# Patient Record
Sex: FEMALE | Race: WHITE | NOT HISPANIC OR LATINO | URBAN - METROPOLITAN AREA
[De-identification: names, ages, dates, MRNs, and addresses within clinical notes are randomized per-mention and may not be internally consistent; named-entity substitution may affect disease eponyms.]

---

## 2019-12-11 ENCOUNTER — OFFICE VISIT (OUTPATIENT)
Dept: OBSTETRICS AND GYNECOLOGY | Facility: CLINIC | Age: 39
End: 2019-12-11
Payer: COMMERCIAL

## 2019-12-11 VITALS
DIASTOLIC BLOOD PRESSURE: 76 MMHG | SYSTOLIC BLOOD PRESSURE: 128 MMHG | BODY MASS INDEX: 19.39 KG/M2 | WEIGHT: 113.6 LBS | HEIGHT: 64 IN

## 2019-12-11 DIAGNOSIS — Z01.419 WELL WOMAN EXAM WITH ROUTINE GYNECOLOGICAL EXAM: ICD-10-CM

## 2019-12-11 DIAGNOSIS — N76.0 ACUTE VAGINITIS: Primary | ICD-10-CM

## 2019-12-11 PROCEDURE — 99386 PREV VISIT NEW AGE 40-64: CPT | Performed by: MIDWIFE

## 2019-12-11 SDOH — ECONOMIC STABILITY: TRANSPORTATION INSECURITY: IN THE PAST 12 MONTHS, HAS LACK OF TRANSPORTATION KEPT YOU FROM MEDICAL APPOINTMENTS OR FROM GETTING MEDICATIONS?: NO

## 2019-12-11 SDOH — ECONOMIC STABILITY: FOOD INSECURITY: WITHIN THE PAST 12 MONTHS, YOU WORRIED THAT YOUR FOOD WOULD RUN OUT BEFORE YOU GOT THE MONEY TO BUY MORE.: NEVER TRUE

## 2019-12-11 SDOH — ECONOMIC STABILITY: FOOD INSECURITY: WITHIN THE PAST 12 MONTHS, THE FOOD YOU BOUGHT JUST DIDN'T LAST AND YOU DIDN'T HAVE MONEY TO GET MORE.: NEVER TRUE

## 2019-12-11 SDOH — ECONOMIC STABILITY: FOOD INSECURITY: HOW HARD IS IT FOR YOU TO PAY FOR THE VERY BASICS LIKE FOOD, HOUSING, MEDICAL CARE, AND HEATING?: NOT HARD AT ALL

## 2019-12-11 ASSESSMENT — ENCOUNTER SYMPTOMS
BACK PAIN: 0
APPETITE CHANGE: 0
PHOTOPHOBIA: 0
DIZZINESS: 0
ABDOMINAL PAIN: 0
BRUISES/BLEEDS EASILY: 0
TREMORS: 0
SHORTNESS OF BREATH: 0
PALPITATIONS: 0
COLOR CHANGE: 0
TROUBLE SWALLOWING: 0
ADENOPATHY: 0
ABDOMINAL DISTENTION: 0
CONFUSION: 0
SLEEP DISTURBANCE: 0
UNEXPECTED WEIGHT CHANGE: 0
BREAST MASS: 0
FLANK PAIN: 0
AGITATION: 0
NAUSEA: 0
FREQUENCY: 0
COUGH: 0
CONSTIPATION: 0
WEAKNESS: 0
VOMITING: 0
NUMBNESS: 0
ANAL BLEEDING: 0
CHEST TIGHTNESS: 0
SEIZURES: 0
BLOOD IN STOOL: 0
RECTAL PAIN: 0
HEADACHES: 0
DEPRESSION: 0
WOUND: 0
NERVOUS/ANXIOUS: 0
FATIGUE: 0
DYSURIA: 0
DIARRHEA: 0
DOUBLE VISION: 0
BREAST PAIN: 0
HEMATURIA: 0
LIGHT-HEADEDNESS: 0
SORE THROAT: 0
ACTIVITY CHANGE: 0
DIFFICULTY URINATING: 0
WHEEZING: 0

## 2019-12-11 ASSESSMENT — ACTIVITIES OF DAILY LIVING (ADL): LACK_OF_TRANSPORTATION: NO

## 2019-12-11 NOTE — PATIENT INSTRUCTIONS
Patient Education   Levonorgestrel intrauterine device (IUD)  What is this medicine?  LEVONORGESTREL IUD (LIONEL alatorre trel) is a contraceptive (birth control) device. The device is placed inside the uterus by a healthcare professional. It is used to prevent pregnancy. This device can also be used to treat heavy bleeding that occurs during your period.  This medicine may be used for other purposes; ask your health care provider or pharmacist if you have questions.  COMMON BRAND NAME(S): Richard, LILTERRA, Amy, Lupis  What should I tell my health care provider before I take this medicine?  They need to know if you have any of these conditions:  -abnormal Pap smear  -cancer of the breast, uterus, or cervix  -diabetes  -endometritis  -genital or pelvic infection now or in the past  -have more than one sexual partner or your partner has more than one partner  -heart disease  -history of an ectopic or tubal pregnancy  -immune system problems  -IUD in place  -liver disease or tumor  -problems with blood clots or take blood-thinners  -seizures  -use intravenous drugs  -uterus of unusual shape  -vaginal bleeding that has not been explained  -an unusual or allergic reaction to levonorgestrel, other hormones, silicone, or polyethylene, medicines, foods, dyes, or preservatives  -pregnant or trying to get pregnant  -breast-feeding  How should I use this medicine?  This device is placed inside the uterus by a health care professional.  Talk to your pediatrician regarding the use of this medicine in children. Special care may be needed.  Overdosage: If you think you have taken too much of this medicine contact a poison control center or emergency room at once.  NOTE: This medicine is only for you. Do not share this medicine with others.  What if I miss a dose?  This does not apply. Depending on the brand of device you have inserted, the device will need to be replaced every 3 to 5 years if you wish to continue using this  type of birth control.  What may interact with this medicine?  Do not take this medicine with any of the following medications:  -amprenavir  -bosentan  -fosamprenavir  This medicine may also interact with the following medications:  -aprepitant  -armodafinil  -barbiturate medicines for inducing sleep or treating seizures  -bexarotene  -boceprevir  -griseofulvin  -medicines to treat seizures like carbamazepine, ethotoin, felbamate, oxcarbazepine, phenytoin, topiramate  -modafinil  -pioglitazone  -rifabutin  -rifampin  -rifapentine  -some medicines to treat HIV infection like atazanavir, efavirenz, indinavir, lopinavir, nelfinavir, tipranavir, ritonavir  -Hamlet's wort  -warfarin  This list may not describe all possible interactions. Give your health care provider a list of all the medicines, herbs, non-prescription drugs, or dietary supplements you use. Also tell them if you smoke, drink alcohol, or use illegal drugs. Some items may interact with your medicine.  What should I watch for while using this medicine?  Visit your doctor or health care professional for regular check ups. See your doctor if you or your partner has sexual contact with others, becomes HIV positive, or gets a sexual transmitted disease.  This product does not protect you against HIV infection (AIDS) or other sexually transmitted diseases.  You can check the placement of the IUD yourself by reaching up to the top of your vagina with clean fingers to feel the threads. Do not pull on the threads. It is a good habit to check placement after each menstrual period. Call your doctor right away if you feel more of the IUD than just the threads or if you cannot feel the threads at all.  The IUD may come out by itself. You may become pregnant if the device comes out. If you notice that the IUD has come out use a backup birth control method like condoms and call your health care provider.  Using tampons will not change the position of the IUD and are  okay to use during your period.  This IUD can be safely scanned with magnetic resonance imaging (MRI) only under specific conditions. Before you have an MRI, tell your healthcare provider that you have an IUD in place, and which type of IUD you have in place.  What side effects may I notice from receiving this medicine?  Side effects that you should report to your doctor or health care professional as soon as possible:  -allergic reactions like skin rash, itching or hives, swelling of the face, lips, or tongue  -fever, flu-like symptoms  -genital sores  -high blood pressure  -no menstrual period for 6 weeks during use  -pain, swelling, warmth in the leg  -pelvic pain or tenderness  -severe or sudden headache  -signs of pregnancy  -stomach cramping  -sudden shortness of breath  -trouble with balance, talking, or walking  -unusual vaginal bleeding, discharge  -yellowing of the eyes or skin  Side effects that usually do not require medical attention (report to your doctor or health care professional if they continue or are bothersome):  -acne  -breast pain  -change in sex drive or performance  -changes in weight  -cramping, dizziness, or faintness while the device is being inserted  -headache  -irregular menstrual bleeding within first 3 to 6 months of use  -nausea  This list may not describe all possible side effects. Call your doctor for medical advice about side effects. You may report side effects to FDA at 9-169-FDA-8308.  Where should I keep my medicine?  This does not apply.  NOTE: This sheet is a summary. It may not cover all possible information. If you have questions about this medicine, talk to your doctor, pharmacist, or health care provider.  © 2019 [employer] (2017-09-29 14:14:56)

## 2019-12-11 NOTE — PROGRESS NOTES
"2019  aRdha Liao is a 39 y.o. female who presents for annual exam.   Periods are regular every 28-30 days, lasting 4 days. Dysmenorrhea:none. Cyclic symptoms include none. No intermenstrual bleeding or spotting.  Reports an increase in vaginal discharge.  She denies any vaginal irritation or burning.  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  She has q 6 months mammograms and ultrasound. She reports several breast biopsies all have been normal.      The patient is sexually active. GYN screening history: last pap: was normal. Domestic violence: no.     Current contraception: condoms  History of abnormal Pap smear: no  Family history of uterine or ovarian cancer: no  Regular self breast exam: yes  History of abnormal mammogram: yes - dense breast gets follow up ultrasounds  Family history of breast cancer: yes - maternal grandmother    Menstrual History:  OB History        3    Para        Term                AB   1    Living   2       SAB   1    TAB        Ectopic        Multiple        Live Births   2                Patient's last menstrual period was 11/15/2019.  Period Cycle (Days): 28  Period Duration (Days): 4  Period Pattern: Regular  Menstrual Flow: Moderate  Menstrual Control: Tampon  Dysmenorrhea: None      Review of Systems and Physical Exam  The following have been reviewed and updated as appropriate in this visit: Tobacco  Allergies  Meds  Problems  Med Hx  Surg Hx  Fam Hx  Soc Hx        Visit Vitals  /76 (BP Location: Right upper arm, Patient Position: Sitting)   Ht 1.626 m (5' 4\")   Wt 51.5 kg (113 lb 9.6 oz)   LMP 11/15/2019   BMI 19.50 kg/m²     HPI  Review of Systems   Constitutional: Negative for activity change, appetite change, fatigue and unexpected weight change.   HENT: Negative for congestion, mouth sores, nosebleeds, sore throat and trouble swallowing.    Eyes: Negative for double vision, photophobia and visual disturbance. "   Respiratory: Negative for cough, chest tightness, shortness of breath and wheezing.    Cardiovascular: Negative for chest pain, palpitations and leg swelling.   Gastrointestinal: Negative for abdominal distention, abdominal pain, anal bleeding, blood in stool, change in stool, constipation, diarrhea, nausea, rectal pain and vomiting.   Endocrine: Negative for cold intolerance and heat intolerance.   Genitourinary: Negative for decreased urine volume, difficulty urinating, dyspareunia, dysuria, enuresis, flank pain, frequency, genital sores, hematuria, irregular menses, menstrual problem, pain with intercourse, pelvic pain, sexual dysfunction, urgency, vaginal bleeding, vaginal discharge, vaginal itching and vaginal pain.   Breast: Negative for breast discharge, breast mass and breast pain.   Musculoskeletal: Negative for back pain and gait problem.   Skin: Negative for color change, pallor, rash and wound.   Allergic/Immunologic: Negative for environmental allergies, food allergies and immunocompromised state.   Neurological: Negative for dizziness, tremors, seizures, syncope, weakness, light-headedness, numbness and headaches.   Hematological: Negative for adenopathy. Does not bruise/bleed easily.   Psychiatric/Behavioral: Negative for agitation, behavioral problems, confusion, depression, self-injury and sleep disturbance. The patient is not nervous/anxious.      Physical Exam   Constitutional: She is oriented to person, place, and time. She appears well-developed and well-nourished.   Genitourinary: Rectum normal, vagina normal and uterus normal. No labial rash or lesion.   External female genitalia normal. No signs of erythema. There is no lesion or tenderness on the right external genitalia. There is no tenderness on the left external genitalia.   Urethral meatus normal.   Urethra normal.   Normal bladder.   Vagina normal. Vagina exhibits no lesion. No bleeding in the vagina. No vaginal discharge found.    Cervix exam normal.Cervix does not exhibit motion tenderness or discharge.   Uterus is normal. Uterus is not mobile or tender.   Adnexa normal. Right adnexum does not display mass, does not display tenderness and does not display fullness. Left adnexum does not display mass, does not display tenderness and does not display fullness. Rectal exam normal.   Neck: Normal range of motion. No thyromegaly present.   Cardiovascular: Normal rate, regular rhythm and normal heart sounds.   No murmur heard.  Pulmonary/Chest: Effort normal and breath sounds normal. No respiratory distress. She has no wheezes. She has no rales. Right breast exhibits no inverted nipple, no mass, no nipple discharge, no skin change and no tenderness. Left breast exhibits no mass, no nipple discharge, no skin change and no tenderness. Breasts are symmetrical.   Abdominal: Soft. Bowel sounds are normal. She exhibits no distension. There is no tenderness.   Musculoskeletal: Normal range of motion. She exhibits no edema.   Neurological: She is alert and oriented to person, place, and time.   Skin: Skin is warm and dry. Capillary refill takes less than 2 seconds.   Psychiatric: She has a normal mood and affect. Her behavior is normal. Judgment and thought content normal.   Vitals reviewed.      There are no diagnoses linked to this encounter..  All questions answered.  Breast self exam technique reviewed and patient encouraged to perform self-exam monthly.  Thin prep Pap smear.   Pt would like Mirena IUD.   ~Reviewed risks/benefits of IUD.  Will schedule for January  Culture for BV sent   No follow-ups on file.  Aury Alves CNM

## 2019-12-12 LAB
Lab: NORMAL
QUEST (ALWAYS MESSAGE): NORMAL
QUEST RESOLUTION:: NORMAL
QUEST: NORMAL

## 2019-12-16 LAB
BV SCORE VAG QL NAA+PROBE: NORMAL
C GLABRATA DNA VAG QL NAA+PROBE: NOT DETECTED
C PARAP DNA VAG QL NAA+PROBE: NOT DETECTED
C TROPICLS DNA VAG QL NAA+PROBE: NOT DETECTED
CANDIDA DNA VAG QL NAA+PROBE: NOT DETECTED
CLINICAL INFO: NORMAL
CYTO CVX: NORMAL
CYTOLOGIST CVX/VAG CYTO: NORMAL
CYTOLOGY CMNT CVX/VAG CYTO-IMP: NORMAL
DATE OF PREVIOUS PAP: NEGATIVE
DATE PREVIOUS BX: NORMAL
G VAGINALIS DNA VAG NAA+PROBE-LOG#: NOT DETECTED
HPV E6+E7 MRNA CVX QL NAA+PROBE: NOT DETECTED
LMP START DATE: NORMAL
MEGASPHAERA SP DNA VAG NAA+PROBE-LOG#: NOT DETECTED
QUEST ATOPOBIUM VAGINAE: NOT DETECTED
QUEST COMMENT (PAP): NORMAL
QUEST COMMENT: NORMAL
QUEST CONTACT:: NORMAL
QUEST LACTOBACILLUS SPECIES: 7.2 LOG (CELLS/ML)
QUEST REPORT ALWAYS MESSAGE DEMOGRAPHICS IN QUESTION: NORMAL
REF LAB TEST NAME: NORMAL
REF LAB TEST: NORMAL
SPECIMEN SOURCE CVX/VAG CYTO: NORMAL
STAT OF ADQ CVX/VAG CYTO-IMP: NORMAL
T VAGINALIS RRNA GENITAL QL PROBE: NORMAL
T VAGINALIS RRNA SPEC QL NAA+PROBE: NOT DETECTED

## 2020-01-13 ENCOUNTER — TELEPHONE (OUTPATIENT)
Dept: OBSTETRICS AND GYNECOLOGY | Facility: CLINIC | Age: 40
End: 2020-01-13

## 2020-01-13 NOTE — TELEPHONE ENCOUNTER
ALIS.     [facility] active & verified Eff: 1/1/15 via [employer]. No C/P, Coins 100%, No Ded, No pre-cert or referral req. 1/13. HARLEY

## 2021-01-28 DIAGNOSIS — Z23 ENCOUNTER FOR IMMUNIZATION: ICD-10-CM

## 2022-06-16 ENCOUNTER — TRANSCRIBE ORDERS (OUTPATIENT)
Dept: SCHEDULING | Age: 42
End: 2022-06-16

## 2022-06-16 DIAGNOSIS — N92.0 EXCESSIVE AND FREQUENT MENSTRUATION WITH REGULAR CYCLE: Primary | ICD-10-CM

## 2022-06-20 ENCOUNTER — HOSPITAL ENCOUNTER (OUTPATIENT)
Dept: RADIOLOGY | Facility: HOSPITAL | Age: 42
Discharge: HOME | End: 2022-06-20
Attending: OBSTETRICS & GYNECOLOGY
Payer: COMMERCIAL

## 2022-06-20 DIAGNOSIS — N92.0 EXCESSIVE AND FREQUENT MENSTRUATION WITH REGULAR CYCLE: ICD-10-CM

## 2022-06-20 PROCEDURE — 76830 TRANSVAGINAL US NON-OB: CPT

## 2024-07-12 ENCOUNTER — OFFICE VISIT (OUTPATIENT)
Dept: OBSTETRICS AND GYNECOLOGY | Facility: CLINIC | Age: 44
End: 2024-07-12
Payer: COMMERCIAL

## 2024-07-12 VITALS
WEIGHT: 118.8 LBS | SYSTOLIC BLOOD PRESSURE: 120 MMHG | HEIGHT: 63 IN | DIASTOLIC BLOOD PRESSURE: 78 MMHG | BODY MASS INDEX: 21.05 KG/M2

## 2024-07-12 DIAGNOSIS — Z01.419 ENCOUNTER FOR GYNECOLOGICAL EXAMINATION: Primary | ICD-10-CM

## 2024-07-12 DIAGNOSIS — Z12.31 ENCOUNTER FOR SCREENING MAMMOGRAM FOR MALIGNANT NEOPLASM OF BREAST: ICD-10-CM

## 2024-07-12 PROCEDURE — 99386 PREV VISIT NEW AGE 40-64: CPT | Performed by: OBSTETRICS & GYNECOLOGY

## 2024-07-12 PROCEDURE — 3008F BODY MASS INDEX DOCD: CPT | Performed by: OBSTETRICS & GYNECOLOGY

## 2024-07-12 RX ORDER — ESCITALOPRAM OXALATE 10 MG/1
10 TABLET ORAL DAILY
COMMUNITY

## 2024-07-12 NOTE — PROGRESS NOTES
"  Subjective     Patient ID: Radha Liao is a 43 y.o. female.    Radha is here for her annual gyn exam.    New pt establishment visit    44yo P2. Condoms, likely vasectomy.    Has been diagnosed with mild hemophilia    Monthly cycles, but have become much heavier over the years. Has at least one day where it can be hard to leave the house.    Mammogram 1/18/24, Birads-2. Dense breast tissue. For this reason, had b/l MRI 3/2/23, Birads-2    Colon Cancer Screening: Colonoscopy 10/6/23, normal. 10 yr repeat interval    TVUS 6/20/22, normal        Review of Systems    Objective     Vitals:    07/12/24 1033   BP: 120/78   Weight: 53.9 kg (118 lb 12.8 oz)   Height: 1.6 m (5' 3\")     Body mass index is 21.04 kg/m².    Physical Exam  Exam conducted with a chaperone present.   Constitutional:       Appearance: Normal appearance.   Pulmonary:      Effort: Pulmonary effort is normal.   Chest:      Chest wall: No mass.   Breasts:     Right: Normal. No swelling, bleeding or mass.      Left: Normal. No bleeding or mass.   Abdominal:      General: There is no distension.      Palpations: There is no mass.      Tenderness: There is no abdominal tenderness.   Genitourinary:  .     General: Normal vulva.      Labia:         Right: No rash, tenderness or lesion.         Left: No rash, tenderness or lesion.       Vagina: Normal.      Cervix: Normal.      Uterus: Normal.       Adnexa: Right adnexa normal and left adnexa normal.   Lymphadenopathy:      Upper Body:      Right upper body: No axillary adenopathy.      Left upper body: No axillary adenopathy.   Skin:     General: Skin is warm and dry.   Neurological:      Mental Status: She is alert.   Psychiatric:         Mood and Affect: Mood normal.         Behavior: Behavior normal.         Assessment/Plan   Diagnoses and all orders for this visit:    Encounter for gynecological examination (Primary)  Comments:  Routine gyn exam  Pap/HPV done  '25 mammo. Dense tissue screening " considerations disc.  CCS UTD  HMB tx options disc. Info on Novasure, Mirena  Orders:  -     Cytology, Thinprep Pap    Encounter for screening mammogram for malignant neoplasm of breast  -     BI SCREENING MAMMOGRAM BILATERAL(TOMOSYNTHESIS); Future

## 2024-07-17 LAB
CLINICAL INFO: NORMAL
CYTO CVX: NORMAL
CYTOLOGIST CVX/VAG CYTO: NORMAL
CYTOLOGY CMNT CVX/VAG CYTO-IMP: NORMAL
DATE OF PREVIOUS PAP: NORMAL
DATE PREVIOUS BX: NORMAL
HPV E6+E7 MRNA CVX QL NAA+PROBE: NOT DETECTED
LMP START DATE: NORMAL
QUEST COMMENT (PAP): NORMAL
SPECIMEN SOURCE CVX/VAG CYTO: NORMAL
STAT OF ADQ CVX/VAG CYTO-IMP: NORMAL